# Patient Record
Sex: MALE | Race: WHITE | NOT HISPANIC OR LATINO | URBAN - METROPOLITAN AREA
[De-identification: names, ages, dates, MRNs, and addresses within clinical notes are randomized per-mention and may not be internally consistent; named-entity substitution may affect disease eponyms.]

---

## 2021-10-05 ENCOUNTER — OFFICE VISIT (OUTPATIENT)
Dept: URGENT CARE | Facility: CLINIC | Age: 21
End: 2021-10-05

## 2021-10-05 VITALS
RESPIRATION RATE: 14 BRPM | TEMPERATURE: 97.3 F | DIASTOLIC BLOOD PRESSURE: 70 MMHG | HEIGHT: 73 IN | OXYGEN SATURATION: 98 % | BODY MASS INDEX: 28.12 KG/M2 | HEART RATE: 57 BPM | SYSTOLIC BLOOD PRESSURE: 110 MMHG | WEIGHT: 212.2 LBS

## 2021-10-05 DIAGNOSIS — J30.2 CHRONIC SEASONAL ALLERGIC RHINITIS: ICD-10-CM

## 2021-10-05 DIAGNOSIS — H69.92 DYSFUNCTION OF LEFT EUSTACHIAN TUBE: ICD-10-CM

## 2021-10-05 PROCEDURE — 99203 OFFICE O/P NEW LOW 30 MIN: CPT | Performed by: NURSE PRACTITIONER

## 2021-10-05 RX ORDER — CYCLOBENZAPRINE HCL 5 MG
TABLET ORAL
COMMUNITY
Start: 2021-07-21

## 2021-10-06 PROBLEM — M25.50 POLYARTHRALGIA: Status: ACTIVE | Noted: 2020-07-07

## 2021-10-06 PROBLEM — Z23 ENCOUNTER FOR IMMUNIZATION: Status: ACTIVE | Noted: 2018-11-29

## 2021-10-06 PROBLEM — G43.909 MIGRAINE: Status: ACTIVE | Noted: 2018-09-20

## 2021-10-06 PROBLEM — J06.9 VIRAL UPPER RESPIRATORY TRACT INFECTION: Status: ACTIVE | Noted: 2018-08-03

## 2021-10-06 PROBLEM — R05.9 COUGH: Status: ACTIVE | Noted: 2018-08-20

## 2021-10-06 PROBLEM — J01.90 ACUTE SINUSITIS: Status: ACTIVE | Noted: 2018-08-20

## 2021-10-06 PROBLEM — M54.6 THORACIC SPINE PAIN: Status: ACTIVE | Noted: 2018-02-26

## 2021-10-06 PROBLEM — I73.00 RAYNAUD'S DISEASE: Status: ACTIVE | Noted: 2019-10-04

## 2021-10-06 PROBLEM — F41.8 MIXED ANXIETY DEPRESSIVE DISORDER: Status: ACTIVE | Noted: 2018-09-20

## 2021-10-06 PROBLEM — M54.16 LUMBAR RADICULOPATHY: Status: ACTIVE | Noted: 2018-02-26

## 2021-10-06 NOTE — PROGRESS NOTES
Chief Complaint   Patient presents with   • Otalgia     (L) ear pain x this morning       HISTORY OF PRESENT ILLNESS: Patient is a pleasant 21 y.o. male who presents to urgent care today with complaints of left ear pain. He has had left ear pain since this morning. He denies fever, chills, malaise, headache. He has a history of ear infections, denies recent infections, this feels similar. He has tried irrigating ear with hydrogen peroxide for treatment. He does have a history of chronic seasonal rhinitis, does not take medication for such on a regular basis.     There are no problems to display for this patient.      Allergies:Gabapentin and Naproxen    Current Outpatient Medications on File Prior to Visit   Medication Sig Dispense Refill   • cyclobenzaprine (FLEXERIL) 5 mg tablet      • meloxicam (MOBIC) 15 MG tablet Take 1 Tablet by mouth every day. 30 Tablet 2   • cyclobenzaprine (FLEXERIL) 10 mg Tab Take 1 Tablet by mouth 3 times a day as needed. 30 Tablet 0   • topiramate (TOPAMAX) 25 MG Tab TAKE ONE TABLET BY MOUTH DAILY 30 Tablet 0   • hydrocodone-acetaminophen (NORCO) 5-325 MG TABS per tablet Take 0.5 Tabs by mouth every 6 hours as needed. 15 Tab 0   • ibuprofen (MOTRIN) 200 MG TABS Take 800 mg by mouth every 6 hours as needed.       No current facility-administered medications on file prior to visit.         Past Medical History:   Diagnosis Date   • Arm fracture, left        Social History     Tobacco Use   • Smoking status: Never Smoker   • Smokeless tobacco: Never Used   Substance Use Topics   • Alcohol use: Not on file   • Drug use: Not on file       No family status information on file.   No family history on file.    ROS:  Review of Systems   Constitutional: Negative for fever, chills, weight loss, malaise, and fatigue.   HENT: Positive for ear pain, rhintis. Negative for nosebleeds, congestion, sore throat and neck pain.    Eyes: Negative for vision changes.   Neuro: Negative for headache, sensory  "changes, weakness, seizure, LOC.   Cardiovascular: Negative for chest pain, palpitations, orthopnea and leg swelling.   Respiratory: Negative for cough, sputum production, shortness of breath and wheezing.   Gastrointestinal: Negative for abdominal pain, nausea, vomiting or diarrhea.   Genitourinary: Negative for dysuria, urgency and frequency.  Musculoskeletal: Negative for falls, neck pain, back pain, joint pain, myalgias.   Skin: Negative for rash, diaphoresis.     Exam:  /70   Pulse (!) 57   Temp 36.3 °C (97.3 °F) (Temporal)   Resp 14   Ht 1.854 m (6' 1\")   Wt 96.3 kg (212 lb 3.2 oz)   SpO2 98%   General: well-nourished, well-developed male in NAD  Head: normocephalic, atraumatic  Eyes: PERRLA, no conjunctival injection, acuity grossly intact, lids normal.  Ears: normal shape and symmetry, no tenderness, no discharge. External canals are without any significant edema or erythema. Tympanic membranes are without any inflammation, scant effusion. Gross auditory acuity is intact.  Nose: symmetrical without tenderness, clear discharge. No sinus tenderness.   Mouth/Throat: reasonable hygiene, no erythema, exudates or tonsillar enlargement.  Neck: no masses, range of motion within normal limits, no tracheal deviation. No obvious thyroid enlargement.   Lymph: no cervical adenopathy. No supraclavicular adenopathy.   Neuro: alert and oriented. Cranial nerves 1-12 grossly intact. No sensory deficit.   Cardiovascular: regular rate and rhythm. No edema.  Pulmonary: no distress. Chest is symmetrical with respiration, no wheezes, crackles, or rhonchi.   Musculoskeletal: no clubbing, appropriate muscle tone, gait is stable.  Skin: warm, dry, intact, no clubbing, no cyanosis, no rashes.   Psych: appropriate mood, affect, judgement.         Assessment/Plan:  1. Chronic seasonal allergic rhinitis     2. Dysfunction of left eustachian tube         Patient presents with otalgia. No signs of infectious process at this " point, suspect ETD. OTC oral allergy medication and Flonase encouraged.   Supportive care, differential diagnoses, and indications for immediate follow-up discussed with patient.   Pathogenesis of diagnosis discussed including typical length and natural progression.   Instructed to return to clinic or nearest emergency department for any change in condition, further concerns, or worsening of symptoms.  Patient states understanding of the plan of care and discharge instructions.  Instructed to make an appointment, for follow up, with his primary care provider.        Please note that this dictation was created using voice recognition software. I have made every reasonable attempt to correct obvious errors, but I expect that there are errors of grammar and possibly content that I did not discover before finalizing the note.      JACINDA Philippe.